# Patient Record
Sex: MALE | Race: WHITE | NOT HISPANIC OR LATINO | URBAN - METROPOLITAN AREA
[De-identification: names, ages, dates, MRNs, and addresses within clinical notes are randomized per-mention and may not be internally consistent; named-entity substitution may affect disease eponyms.]

---

## 2023-01-29 ENCOUNTER — EMERGENCY (EMERGENCY)
Facility: HOSPITAL | Age: 43
LOS: 1 days | Discharge: HOME | End: 2023-01-29
Attending: EMERGENCY MEDICINE | Admitting: EMERGENCY MEDICINE
Payer: SELF-PAY

## 2023-01-29 VITALS
SYSTOLIC BLOOD PRESSURE: 131 MMHG | TEMPERATURE: 98 F | OXYGEN SATURATION: 96 % | RESPIRATION RATE: 16 BRPM | HEART RATE: 70 BPM | DIASTOLIC BLOOD PRESSURE: 57 MMHG

## 2023-01-29 VITALS
SYSTOLIC BLOOD PRESSURE: 117 MMHG | TEMPERATURE: 99 F | DIASTOLIC BLOOD PRESSURE: 63 MMHG | HEART RATE: 67 BPM | RESPIRATION RATE: 17 BRPM | OXYGEN SATURATION: 96 %

## 2023-01-29 DIAGNOSIS — F10.920 ALCOHOL USE, UNSPECIFIED WITH INTOXICATION, UNCOMPLICATED: ICD-10-CM

## 2023-01-29 PROCEDURE — 99284 EMERGENCY DEPT VISIT MOD MDM: CPT

## 2023-01-29 PROCEDURE — 82962 GLUCOSE BLOOD TEST: CPT

## 2023-01-29 PROCEDURE — 99285 EMERGENCY DEPT VISIT HI MDM: CPT

## 2023-01-29 NOTE — ED PROVIDER NOTE - NSFOLLOWUPINSTRUCTIONS_ED_ALL_ED_FT
ALCOHOL INTOXICATION - Drink plenty of fluids and avoid excessive alcohol intake. Follow up with your primary medical doctor within one week. Return to the Emergency department for persistent, worsening, or new symptoms including persistent vomiting, any vomiting with blood, if you become shaky when you try and stop drinking, if you have any uncontrollable shaking, or if you have any other serious concerns.

## 2023-01-29 NOTE — ED ADULT TRIAGE NOTE - CHIEF COMPLAINT QUOTE
pt BIBA from street as per EMS " EMS was called by [facility], pt found on laying on the street, with pin point pupils, admited to EMS doing 2 bags of dope, but denies alcohol or drug use in triage"

## 2023-01-29 NOTE — ED PROVIDER NOTE - PROGRESS NOTE DETAILS
pt now awake, ambulatory to bathroom, requesting sandwhich.   ate sandwhich and drank water w/o issue  again admits to drug / alcohol use. denies complaints.   Pt now more clinically sober - AOx3 and answering all questions appropriately. Denies any complaints. Ambulating easily without assistance. Requesting to leave. No acute injury on reexamination. Ready for d/c.

## 2023-01-29 NOTE — ED ADULT NURSE NOTE - OBJECTIVE STATEMENT
Pt. a&ox4 @ baseline hx of heroin use, bib [employer] after being found lying outside on the ground with pinpoint pupils. Pt. arrives sleeping, arousable to pain stimuli / sternal rub, opens eyes on command. Pt. BGl wnl on arrival. Unable to carry conversation and provide background information/ pmhx. As per EMS pt admitted to using "2 bags of dope".

## 2023-01-29 NOTE — ED ADULT NURSE NOTE - SUICIDE SCREENING QUESTION 2
Abdomen soft, nontender, nondistended, bowel sounds present in all 4 quadrants. Patient unable to complete

## 2023-01-29 NOTE — ED PROVIDER NOTE - PATIENT PORTAL LINK FT
You can access the FollowMyHealth Patient Portal offered by [facility] by registering at the following website: [email or web address]. By joining [employer]’s FollowMyHealth portal, you will also be able to view your health information using other applications (apps) compatible with our system.

## 2023-01-29 NOTE — ED PROVIDER NOTE - OBJECTIVE STATEMENT
42 yr old male, unk medical history, BIBEMS for altered mental status. history limited due to clinically intoxicated.   per EMS called for pt being slumped over in street. admitted to alcohol and drug use. no reported / witnessed trauma. on evaluated in ED - admit to drugs / alcohol. will not specify drug use. denies trauma.

## 2023-01-29 NOTE — ED PROVIDER NOTE - PHYSICAL EXAMINATION
Gen: sleeping comfortably on stretcher, wakes to tactile stimuli  Skin: warm, no abrasions/lacerations/ecchymosis  HEENT: normocephalic, atraumatic. moist mucous membranes, PERRL  Lungs: respirations even and unlabored  CV: extremities warm and well perfused, 2+ radial and DP pulses, good cap refill  Ext: moving all extremities  Vasc: 2+ pulses throughout, capillary refill <2 seconds  Neuro: moving all extremities spontaneously, wakes to tactile stimuli.